# Patient Record
Sex: FEMALE | Race: OTHER | URBAN - METROPOLITAN AREA
[De-identification: names, ages, dates, MRNs, and addresses within clinical notes are randomized per-mention and may not be internally consistent; named-entity substitution may affect disease eponyms.]

---

## 2017-04-05 NOTE — PATIENT DISCUSSION
REFER TO CORNEAL SPECIALIST,  DR. SINGH [facility] FOR BAND KERATOPATHY TO EVALUATE AND TREAT.

## 2017-04-05 NOTE — PATIENT DISCUSSION
POAG, OU:  INTRAOCULAR PRESSURE IS WITHIN ACCEPTABLE LIMITS. PATIENT INSTRUCTED TO CONTINUE LATANOPROST QHS OU AND COMBIGAN BID OU AND RETURN FOR FOLLOW-UP AS SCHEDULED.

## 2017-10-03 NOTE — PATIENT DISCUSSION
POAG, OU:  INTRAOCULAR PRESSURE IS WITHIN ACCEPTABLE LIMITS. PATIENT INSTRUCTED TO CONTINUE _COMBIGAN BID OU AND LATANOPROST QHS OU _______ AND RETURN FOR FOLLOW-UP AS SCHEDULED.

## 2018-01-09 NOTE — PATIENT DISCUSSION
POAG, OU:  INTRAOCULAR PRESSURE IS WITHIN ACCEPTABLE LIMITS. PATIENT INSTRUCTED TO CONTINUE __both gtts______ AND RETURN FOR FOLLOW-UP AS SCHEDULED.

## 2022-07-14 ENCOUNTER — ESTABLISHED PATIENT (OUTPATIENT)
Dept: URBAN - METROPOLITAN AREA CLINIC 20 | Facility: CLINIC | Age: 27
End: 2022-07-14

## 2022-07-14 DIAGNOSIS — H04.123: ICD-10-CM

## 2022-07-14 DIAGNOSIS — H52.13: ICD-10-CM

## 2022-07-14 PROCEDURE — 92015 DETERMINE REFRACTIVE STATE: CPT

## 2022-07-14 PROCEDURE — 92310C CONTACT LENS 75

## 2022-07-14 PROCEDURE — 92014 COMPRE OPH EXAM EST PT 1/>: CPT

## 2022-07-14 ASSESSMENT — KERATOMETRY
OS_K2POWER_DIOPTERS: 45.75
OS_K1POWER_DIOPTERS: 44.25
OD_AXISANGLE_DEGREES: 12
OD_AXISANGLE2_DEGREES: 102
OS_AXISANGLE_DEGREES: 166
OD_K2POWER_DIOPTERS: 45.50
OS_AXISANGLE2_DEGREES: 76
OD_K1POWER_DIOPTERS: 44.25

## 2022-07-14 ASSESSMENT — TONOMETRY
OD_IOP_MMHG: 11
OS_IOP_MMHG: 11

## 2022-07-14 ASSESSMENT — VISUAL ACUITY
OD_CC: 20/20-1
OU_SC: 20/20-1
OS_CC: 20/20

## 2024-03-01 ENCOUNTER — ESTABLISHED PATIENT (OUTPATIENT)
Dept: URBAN - METROPOLITAN AREA CLINIC 19 | Facility: CLINIC | Age: 29
End: 2024-03-01

## 2024-03-01 DIAGNOSIS — H04.123: ICD-10-CM

## 2024-03-01 DIAGNOSIS — H52.13: ICD-10-CM

## 2024-03-01 PROCEDURE — 92015 DETERMINE REFRACTIVE STATE: CPT

## 2024-03-01 PROCEDURE — 92310B CONTACT LEN 60

## 2024-03-01 PROCEDURE — 92012 INTRM OPH EXAM EST PATIENT: CPT

## 2024-03-01 ASSESSMENT — VISUAL ACUITY
OU_CC: 20/25
OD_CC: 20/30-1
OS_CC: 20/40

## 2024-03-01 ASSESSMENT — KERATOMETRY
OS_AXISANGLE_DEGREES: 166
OD_AXISANGLE_DEGREES: 12
OD_K1POWER_DIOPTERS: 44.25
OD_K2POWER_DIOPTERS: 45.50
OD_AXISANGLE2_DEGREES: 102
OS_K1POWER_DIOPTERS: 44.25
OS_AXISANGLE2_DEGREES: 76
OS_K2POWER_DIOPTERS: 45.75

## 2024-03-01 ASSESSMENT — TONOMETRY
OS_IOP_MMHG: 15
OD_IOP_MMHG: 13

## 2024-04-10 ENCOUNTER — FOLLOW UP (OUTPATIENT)
Dept: URBAN - METROPOLITAN AREA CLINIC 19 | Facility: CLINIC | Age: 29
End: 2024-04-10

## 2024-04-10 DIAGNOSIS — H52.13: ICD-10-CM

## 2024-04-10 PROCEDURE — 99211NC NO CHARGE VISIT

## 2024-04-10 ASSESSMENT — KERATOMETRY
OD_K1POWER_DIOPTERS: 44.25
OS_K2POWER_DIOPTERS: 45.75
OD_K2POWER_DIOPTERS: 45.50
OD_AXISANGLE2_DEGREES: 102
OS_AXISANGLE_DEGREES: 166
OS_K1POWER_DIOPTERS: 44.25
OS_AXISANGLE2_DEGREES: 76
OD_AXISANGLE_DEGREES: 12

## 2024-04-10 ASSESSMENT — VISUAL ACUITY
OD_CC: 20/25-1
OU_CC: 20/20
OS_CC: 20/25-1